# Patient Record
Sex: FEMALE | URBAN - METROPOLITAN AREA
[De-identification: names, ages, dates, MRNs, and addresses within clinical notes are randomized per-mention and may not be internally consistent; named-entity substitution may affect disease eponyms.]

---

## 2020-10-09 ENCOUNTER — TELEPHONE (OUTPATIENT)
Dept: BARIATRICS/WEIGHT MGMT | Age: 30
End: 2020-10-09

## 2020-10-19 ENCOUNTER — TELEPHONE (OUTPATIENT)
Dept: BARIATRICS/WEIGHT MGMT | Age: 30
End: 2020-10-19

## 2020-10-19 NOTE — TELEPHONE ENCOUNTER
Spoke to pt. Pt attended seminar online 10/6/20 with Dr. Darshan Carlton. Pt DOES NOT HAVE  BWLS coverage with [place], plan exclusion. BMI Form scanned in media.  Thanks

## 2023-04-25 ENCOUNTER — OFFICE VISIT (OUTPATIENT)
Dept: URGENT CARE | Age: 33
End: 2023-04-25

## 2023-04-25 VITALS
SYSTOLIC BLOOD PRESSURE: 126 MMHG | RESPIRATION RATE: 17 BRPM | DIASTOLIC BLOOD PRESSURE: 91 MMHG | OXYGEN SATURATION: 98 % | HEIGHT: 62 IN | HEART RATE: 89 BPM | TEMPERATURE: 98.4 F | WEIGHT: 215 LBS | BODY MASS INDEX: 39.56 KG/M2

## 2023-04-25 DIAGNOSIS — J10.1 INFLUENZA A: Primary | ICD-10-CM

## 2023-04-25 DIAGNOSIS — R03.0 ELEVATED BP WITHOUT DIAGNOSIS OF HYPERTENSION: ICD-10-CM

## 2023-04-25 PROBLEM — I10 ESSENTIAL HYPERTENSION: Status: ACTIVE | Noted: 2022-06-07

## 2023-04-25 PROBLEM — R87.610 ATYPICAL SQUAMOUS CELLS OF UNDETERMINED SIGNIFICANCE ON CYTOLOGIC SMEAR OF CERVIX (ASC-US): Status: ACTIVE | Noted: 2022-12-12

## 2023-04-25 PROBLEM — B97.7 HPV IN FEMALE: Status: ACTIVE | Noted: 2022-12-12

## 2023-04-25 PROBLEM — F32.A ANXIETY AND DEPRESSION: Status: ACTIVE | Noted: 2018-01-01

## 2023-04-25 PROBLEM — F41.9 ANXIETY AND DEPRESSION: Status: ACTIVE | Noted: 2018-01-01

## 2023-04-25 LAB
INFLUENZA VIRUS A RNA: POSITIVE
INFLUENZA VIRUS B RNA: NEGATIVE
Lab: NORMAL
QC PASS/FAIL: NORMAL
SARS-COV-2 RDRP RESP QL NAA+PROBE: NEGATIVE
STREPTOCOCCUS A RNA: NEGATIVE

## 2023-04-25 RX ORDER — ALPRAZOLAM 0.5 MG/1
TABLET ORAL
COMMUNITY
Start: 2023-02-28

## 2023-04-25 RX ORDER — SERTRALINE HYDROCHLORIDE 100 MG/1
TABLET, FILM COATED ORAL
COMMUNITY
Start: 2023-03-30

## 2023-04-25 RX ORDER — ETONOGESTREL AND ETHINYL ESTRADIOL 11.7; 2.7 MG/1; MG/1
1 INSERT, EXTENDED RELEASE VAGINAL
COMMUNITY
Start: 2020-01-15

## 2023-04-25 ASSESSMENT — ENCOUNTER SYMPTOMS
COUGH: 1
SINUS PRESSURE: 0
SHORTNESS OF BREATH: 0
RHINORRHEA: 0
WHEEZING: 0
SINUS PAIN: 0
SORE THROAT: 1

## 2023-04-25 NOTE — PATIENT INSTRUCTIONS
Influenza A positive in clinic today (negative influenza B)  Rapid strep negative in clinic today  NAAT COVID negative in clinic today   Results reviewed with patient   Patient declined Tamiflu  Rest, hydrate, OTC symptom relief  Monitor BP at home and call PCP if persistently elevated. ER evaluation if symptoms persist or if symptoms worsen.   New Prescriptions    No medications on file

## 2023-04-25 NOTE — PROGRESS NOTES
Dani Van (:  1990) is a 27 y.o. female,New patient, here for evaluation of the following chief complaint(s):  Sinusitis, Congestion, and Cough (Possible bronchitis  and turned into dry cough and throat is horse )      ASSESSMENT/PLAN:    ICD-10-CM    1. Influenza A  J10.1 POCT Rapid Strep A DNA ([facility])     POCT Influenza A/B DNA ([facility] i)     POCT COVID-19 Rapid, NAAT      2. Elevated BP without diagnosis of hypertension  R03.0         Influenza A positive in clinic today (negative influenza B)  Rapid strep negative in clinic today  NAAT COVID negative in clinic today   Results reviewed with patient   Patient declined Tamiflu  Rest, hydrate, OTC symptom relief  Monitor BP at home and call PCP if persistently elevated. ER evaluation if symptoms persist or if symptoms worsen. SUBJECTIVE/OBJECTIVE:    History provided by:  Patient   used: No    HPI:   27 y.o. female presents with symptoms of body aches, cough, sore throat, and head congestion ongoing since 2023. Had temp of 100.6 on 2023. Denies known flu, COVID, or strep at home. Has taken ibuprofen, Mucinex, and cold/flu for symptoms with mild relief. She \"had an extra ZPak\" that she started on  that has not helped. Vitals:    23 1122 23 1211   BP: (!) 138/95 (!) 126/91   Pulse: 89    Resp: 17    Temp: 98.4 °F (36.9 °C)    SpO2: 98%    Weight: 215 lb (97.5 kg)    Height: 5' 2\" (1.575 m)        Review of Systems   Constitutional:  Positive for fatigue and fever. Negative for chills and diaphoresis. HENT:  Positive for congestion, ear pain and sore throat. Negative for rhinorrhea, sinus pressure and sinus pain. Respiratory:  Positive for cough (productive of thick yellow mucous). Negative for shortness of breath and wheezing. Musculoskeletal:  Positive for myalgias. Neurological:  Positive for headaches. All other systems reviewed and are negative.     Physical Exam  Vitals